# Patient Record
Sex: MALE | Race: OTHER | URBAN - METROPOLITAN AREA
[De-identification: names, ages, dates, MRNs, and addresses within clinical notes are randomized per-mention and may not be internally consistent; named-entity substitution may affect disease eponyms.]

---

## 2020-06-03 ENCOUNTER — IMPORTED ENCOUNTER (OUTPATIENT)
Dept: URBAN - METROPOLITAN AREA CLINIC 50 | Facility: CLINIC | Age: 30
End: 2020-06-03

## 2020-06-04 ENCOUNTER — IMPORTED ENCOUNTER (OUTPATIENT)
Dept: URBAN - METROPOLITAN AREA CLINIC 50 | Facility: CLINIC | Age: 30
End: 2020-06-04

## 2020-06-04 NOTE — PATIENT DISCUSSION
"""Patient is not currently taking an Allegra or Zyrtec but has in the past for allergy symptoms.  ""

## 2020-06-04 NOTE — PATIENT DISCUSSION
"""Discussed with patient that the only way to really find out if anything is wrong with patient would ""

## 2021-04-17 ASSESSMENT — VISUAL ACUITY
OD_SC: 20/20
OS_PH: @ 16 IN
OD_PH: @ 16 IN
OS_CC: J1@ 16 IN
OS_SC: 20/20
OD_CC: J1@ 16 IN

## 2021-04-17 ASSESSMENT — TONOMETRY
OD_IOP_MMHG: 20
OS_IOP_MMHG: 18